# Patient Record
Sex: MALE | Race: BLACK OR AFRICAN AMERICAN
[De-identification: names, ages, dates, MRNs, and addresses within clinical notes are randomized per-mention and may not be internally consistent; named-entity substitution may affect disease eponyms.]

---

## 2019-11-30 ENCOUNTER — HOSPITAL ENCOUNTER (EMERGENCY)
Dept: HOSPITAL 56 - MW.ED | Age: 39
Discharge: HOME | End: 2019-11-30
Payer: COMMERCIAL

## 2019-11-30 DIAGNOSIS — J32.9: ICD-10-CM

## 2019-11-30 DIAGNOSIS — I10: ICD-10-CM

## 2019-11-30 DIAGNOSIS — J40: Primary | ICD-10-CM

## 2019-11-30 DIAGNOSIS — Z79.899: ICD-10-CM

## 2020-01-08 ENCOUNTER — HOSPITAL ENCOUNTER (OUTPATIENT)
Dept: HOSPITAL 41 - JD.SDS | Age: 40
Discharge: HOME | End: 2020-01-08
Attending: ORTHOPAEDIC SURGERY
Payer: COMMERCIAL

## 2020-01-08 DIAGNOSIS — S83.241A: ICD-10-CM

## 2020-01-08 DIAGNOSIS — E66.3: ICD-10-CM

## 2020-01-08 DIAGNOSIS — Z79.899: ICD-10-CM

## 2020-01-08 DIAGNOSIS — M17.11: Primary | ICD-10-CM

## 2020-01-08 DIAGNOSIS — I10: ICD-10-CM

## 2020-01-08 DIAGNOSIS — M22.41: ICD-10-CM

## 2020-01-08 DIAGNOSIS — Z79.82: ICD-10-CM

## 2020-01-08 PROCEDURE — 29881 ARTHRS KNE SRG MNISECTMY M/L: CPT

## 2020-01-08 PROCEDURE — 90471 IMMUNIZATION ADMIN: CPT

## 2020-01-08 PROCEDURE — G0008 ADMIN INFLUENZA VIRUS VAC: HCPCS

## 2020-01-08 PROCEDURE — 90686 IIV4 VACC NO PRSV 0.5 ML IM: CPT

## 2020-01-08 PROCEDURE — 87641 MR-STAPH DNA AMP PROBE: CPT

## 2020-01-08 NOTE — [id number]
Post Anesthesia Note





- EVALUATION WITHIN 48HRS OF ANESTHETIC


Vital Signs in Normal Range: Yes


Patient Participated in Evaluation: Yes


Respiratory Function Stable: Yes


Airway Patent: Yes


Cardiovascular Function Stable: Yes


Hydration Status Stable: Yes


Pain Control Satisfactory: Yes


Nausea and Vomiting Control Satisfactory: Yes


Mental Status Recovered: Yes (no complaints)


Vital Signs: 


 Last Vital Signs











Temp  97.9 F   01/08/20 09:30


 


Pulse  78   01/08/20 09:30


 


Resp  100 H  01/08/20 09:30


 


BP  141/90 H  01/08/20 09:30


 


Pulse Ox  18 L  01/08/20 09:30

## 2020-01-08 NOTE — PCM.POSTAN
POST ANESTHESIA ASSESSMENT





- MENTAL STATUS


Mental Status: Somnolent





- VITAL SIGNS


Vital Signs: 


 Last Vital Signs











Temp  97.9 F   01/08/20 06:35


 


Pulse  76   01/08/20 06:35


 


Resp  16   01/08/20 06:35


 


BP  155/96 H  01/08/20 06:35


 


Pulse Ox  99   01/08/20 06:35








69


13


97.9


134/81


99%





- RESPIRATORY


Respiratory Status: Respiratory Rate WNL, Airway Patent, O2 Saturation Stable, 

Supplemental Oxygen





- CARDIOVASCULAR


CV Status: Pulse Rate WNL, Blood Pressure Stable





- GASTROINTESTINAL


GI Status: No Symptoms





- PAIN


Pain Score: 0





- POST OP HYDRATION


Hydration Status: Adequate & Stable

## 2020-01-10 NOTE — OR
DATE OF OPERATION:  01/08/2020

 

SURGEON:  Yovani Mathews MD

 

OPERATION PERFORMED:  Right knee video arthroscopy with chondroplasty and

resection of osteophytes with partial medial meniscectomy.

 

PREOPERATIVE DIAGNOSIS:

Right knee osteoarthrosis with medial meniscus tear.

 

POSTOPERATIVE DIAGNOSIS:

Right knee osteoarthrosis with medial meniscus tear.

 

ANESTHESIA:

General LMA with local.

 

ANESTHESIA PROVIDER:

Jennifer Nye CRNA.

 

ASSISTANT:

Lisseth Burgos PA-C.

 

ESTIMATED BLOOD LOSS:

Less than 5 mL.

 

COMPLICATIONS:

None.

 

CONDITION:

Stable.

 

DESCRIPTION OF PROCEDURE:

The patient was identified in the preop holding area.  Proper site was marked

and identified and a time-out was performed.  The patient was taken back to the

operating theater, where after adequate anesthesia, the patient's left lower

extremity was placed in a Well-Leg ramon.  Right lower extremity had a

nonsterile tourniquet applied and then was placed in a C-clamp ramon.  Foot of

the bed was then lowered.  Right lower extremity was then sterilely prepped and

draped in the usual sterile fashion.  OR time-out was performed.  The patient

received 2 g of IV Ancef.  Right lower extremity was then exsanguinated.

Tourniquet was insufflated to 250 mmHg.  A standard anterolateral portal

incision was made.  Scope trocar was introduced to the knee joint.  The patient

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

patella.  He was noted to have a large osteophyte on the medial femoral condyle

as well as the lateral femoral condyle as well as a smaller one on the lateral

femoral condyle.  At this time, the patient was noted have medial meniscus tear

and partial medial meniscectomy was performed back to a stable rim after an

anteromedial portal was created with the use of a spinal needle.  At this time,

using a 5-0 full-radius carson, I did resect most of the medial osteophyte off the

femur.  The ACL was found to be deficient in the notch.  The lateral condyle did

show grade 2/3 chondromalacia as well as the medial femoral condyle, and I did

perform a chondroplasty of any loose pieces, and then, the carson was then again

used for the small osteophyte off the lateral femoral condyle.  The patient at

this time had excess saline drained from the knee. 3-0 nylon suture was used for

closure of the skin.  The patient tolerated the procedure well, sent to PACU in

stable condition.

 

DD:  01/10/2020 09:50:47

DT:  01/10/2020 10:16:02  TYRELL

Job #:  [id number]

## 2020-01-10 NOTE — PCM.OPNOTE
- General Post-Op/Procedure Note


Date of Surgery/Procedure: 01/08/20


Operative Procedure(s): right knee video arthroscopy with chondroplasty and 

resection of osteophytes with partial medial meiscecotmy


Pre Op Diagnosis: right knee osteoarthrosis with medial meniscus tear


Post-Op Diagnosis: Same


Anesthesia Technique: General LMA, Local


Primary Surgeon: Yovani Mathews


Anesthesia Provider: Jennifer Nye


Assistant: Lisseth Burgos in mLs: 5


Complications: None


Condition: Good

## 2020-01-13 ENCOUNTER — HOSPITAL ENCOUNTER (INPATIENT)
Dept: HOSPITAL 56 - MW.ED | Age: 40
LOS: 3 days | Discharge: HOME | DRG: 206 | End: 2020-01-16
Attending: INTERNAL MEDICINE | Admitting: INTERNAL MEDICINE
Payer: COMMERCIAL

## 2020-01-13 DIAGNOSIS — I26.99: ICD-10-CM

## 2020-01-13 DIAGNOSIS — I10: ICD-10-CM

## 2020-01-13 DIAGNOSIS — Z79.82: ICD-10-CM

## 2020-01-13 DIAGNOSIS — I82.451: ICD-10-CM

## 2020-01-13 DIAGNOSIS — F41.9: ICD-10-CM

## 2020-01-13 DIAGNOSIS — I97.89: Primary | ICD-10-CM

## 2020-01-13 DIAGNOSIS — I82.441: ICD-10-CM
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
## 2020-01-13 DIAGNOSIS — F32.9: ICD-10-CM

## 2020-01-13 LAB
BUN SERPL-MCNC: 16 MG/DL (ref 7–18)
CHLORIDE SERPL-SCNC: 104 MMOL/L (ref 98–107)
CO2 SERPL-SCNC: 26.5 MMOL/L (ref 21–32)
GLUCOSE SERPL-MCNC: 82 MG/DL (ref 74–106)
POTASSIUM SERPL-SCNC: 4.2 MMOL/L (ref 3.5–5.1)
SODIUM SERPL-SCNC: 140 MMOL/L (ref 136–148)

## 2020-01-13 PROCEDURE — G0378 HOSPITAL OBSERVATION PER HR: HCPCS

## 2020-01-13 RX ADMIN — HYDROCODONE BITARTRATE AND ACETAMINOPHEN PRN TAB: 5; 325 TABLET ORAL at 16:24

## 2020-01-14 LAB
BUN SERPL-MCNC: 16 MG/DL (ref 7–18)
CHLORIDE SERPL-SCNC: 103 MMOL/L (ref 98–107)
CO2 SERPL-SCNC: 27.8 MMOL/L (ref 21–32)
GLUCOSE SERPL-MCNC: 89 MG/DL (ref 74–106)
POTASSIUM SERPL-SCNC: 4.1 MMOL/L (ref 3.5–5.1)
SODIUM SERPL-SCNC: 139 MMOL/L (ref 136–148)

## 2020-01-14 RX ADMIN — HYDROCODONE BITARTRATE AND ACETAMINOPHEN PRN TAB: 5; 325 TABLET ORAL at 00:10

## 2020-01-14 RX ADMIN — HYDROCODONE BITARTRATE AND ACETAMINOPHEN PRN TAB: 5; 325 TABLET ORAL at 04:18

## 2020-01-14 RX ADMIN — HEPARIN SODIUM SCH MLS/HR: 5000 INJECTION, SOLUTION INTRAVENOUS at 20:13

## 2020-01-15 LAB
BUN SERPL-MCNC: 11 MG/DL (ref 7–18)
CHLORIDE SERPL-SCNC: 100 MMOL/L (ref 98–107)
CO2 SERPL-SCNC: 28 MMOL/L (ref 21–32)
GLUCOSE SERPL-MCNC: 108 MG/DL (ref 74–106)
POTASSIUM SERPL-SCNC: 3.8 MMOL/L (ref 3.5–5.1)
SODIUM SERPL-SCNC: 138 MMOL/L (ref 136–148)

## 2020-01-15 RX ADMIN — HEPARIN SODIUM SCH MLS/HR: 5000 INJECTION, SOLUTION INTRAVENOUS at 10:15

## 2020-01-16 LAB
BUN SERPL-MCNC: 12 MG/DL (ref 7–18)
CHLORIDE SERPL-SCNC: 96 MMOL/L (ref 98–107)
CO2 SERPL-SCNC: 29.2 MMOL/L (ref 21–32)
GLUCOSE SERPL-MCNC: 93 MG/DL (ref 74–106)
POTASSIUM SERPL-SCNC: 3.6 MMOL/L (ref 3.5–5.1)
SODIUM SERPL-SCNC: 135 MMOL/L (ref 136–148)

## 2020-01-16 RX ADMIN — HEPARIN SODIUM SCH MLS/HR: 5000 INJECTION, SOLUTION INTRAVENOUS at 01:16

## 2020-01-20 ENCOUNTER — HOSPITAL ENCOUNTER (EMERGENCY)
Dept: HOSPITAL 56 - MW.ED | Age: 40
Discharge: HOME | End: 2020-01-20
Payer: COMMERCIAL

## 2020-01-20 DIAGNOSIS — Z86.711: ICD-10-CM

## 2020-01-20 DIAGNOSIS — F32.9: ICD-10-CM

## 2020-01-20 DIAGNOSIS — I10: ICD-10-CM

## 2020-01-20 DIAGNOSIS — Z79.899: ICD-10-CM

## 2020-01-20 DIAGNOSIS — M79.661: Primary | ICD-10-CM

## 2020-01-20 DIAGNOSIS — F41.9: ICD-10-CM

## 2020-01-30 ENCOUNTER — HOSPITAL ENCOUNTER (OUTPATIENT)
Dept: HOSPITAL 41 - JD.SDS | Age: 40
Setting detail: OBSERVATION
LOS: 1 days | Discharge: HOME | End: 2020-01-31
Attending: ORTHOPAEDIC SURGERY | Admitting: ORTHOPAEDIC SURGERY
Payer: COMMERCIAL

## 2020-01-30 DIAGNOSIS — F32.9: ICD-10-CM

## 2020-01-30 DIAGNOSIS — M25.061: ICD-10-CM

## 2020-01-30 DIAGNOSIS — I10: ICD-10-CM

## 2020-01-30 DIAGNOSIS — M25.461: Primary | ICD-10-CM

## 2020-01-30 DIAGNOSIS — M17.11: ICD-10-CM

## 2020-01-30 DIAGNOSIS — F41.9: ICD-10-CM

## 2020-01-30 DIAGNOSIS — Z79.899: ICD-10-CM

## 2020-01-30 PROCEDURE — 36415 COLL VENOUS BLD VENIPUNCTURE: CPT

## 2020-01-30 PROCEDURE — G0378 HOSPITAL OBSERVATION PER HR: HCPCS

## 2020-01-30 PROCEDURE — 87641 MR-STAPH DNA AMP PROBE: CPT

## 2020-01-30 PROCEDURE — 87075 CULTR BACTERIA EXCEPT BLOOD: CPT

## 2020-01-30 PROCEDURE — 87205 SMEAR GRAM STAIN: CPT

## 2020-01-30 PROCEDURE — 29875 ARTHRS KNEE SURG SYNVCT LMTD: CPT

## 2020-01-30 PROCEDURE — 94760 N-INVAS EAR/PLS OXIMETRY 1: CPT

## 2020-01-30 PROCEDURE — 85027 COMPLETE CBC AUTOMATED: CPT

## 2020-01-30 PROCEDURE — 80053 COMPREHEN METABOLIC PANEL: CPT

## 2020-01-30 RX ADMIN — EPINEPHRINE ONE MG: 1 INJECTION PARENTERAL at 15:30

## 2020-01-30 RX ADMIN — OXYCODONE HYDROCHLORIDE AND ACETAMINOPHEN PRN TAB: 5; 325 TABLET ORAL at 16:47

## 2020-01-30 RX ADMIN — FENTANYL CITRATE PRN MCG: 50 INJECTION, SOLUTION INTRAMUSCULAR; INTRAVENOUS at 17:08

## 2020-01-30 RX ADMIN — OXYCODONE HYDROCHLORIDE AND ACETAMINOPHEN PRN TAB: 5; 325 TABLET ORAL at 21:34

## 2020-01-30 RX ADMIN — FENTANYL CITRATE PRN MCG: 50 INJECTION, SOLUTION INTRAMUSCULAR; INTRAVENOUS at 16:16

## 2020-01-30 RX ADMIN — FENTANYL CITRATE PRN MCG: 50 INJECTION, SOLUTION INTRAMUSCULAR; INTRAVENOUS at 16:28

## 2020-01-30 RX ADMIN — FENTANYL CITRATE PRN MCG: 50 INJECTION, SOLUTION INTRAMUSCULAR; INTRAVENOUS at 16:44

## 2020-01-30 RX ADMIN — EPINEPHRINE ONE: 1 INJECTION PARENTERAL at 20:05

## 2020-01-30 NOTE — PCM.POSTAN
POST ANESTHESIA ASSESSMENT





- MENTAL STATUS


Mental Status: Alert, Oriented





- VITAL SIGNS


Vital Signs: 


 Last Vital Signs











Temp  36.6 C   01/30/20 16:12


 


Pulse  102 H  01/30/20 16:12


 


Resp  22 H  01/30/20 16:12


 


BP  180/110  01/30/20 16:12


 


Pulse Ox  99   01/30/20 16:12














- RESPIRATORY


Respiratory Status: Respiratory Rate WNL, Airway Patent, O2 Saturation Stable, 

Supplemental Oxygen





- CARDIOVASCULAR


CV Status: Pulse Rate WNL, Elevated Blood Pressure





- GASTROINTESTINAL


GI Status: No Symptoms





- PAIN


Pain Score: 10





- POST OP HYDRATION


Hydration Status: Adequate & Stable

## 2020-01-30 NOTE — PCM.SN
- Free Text/Narrative


Note: 





Pain Management: 





Ramos continues to have intense pain in PACU. Loretta Montanez notified of 

continued pain level of 10/10. Requested 15 mg IV ketorolac one dose. Last 

Xarelto dose was 1/29/2020 at 0800. Will continue to monitor in PACU.

## 2020-01-30 NOTE — PCM.PREANE
Preanesthetic Assessment





- Procedure


Proposed Procedure: 





right knee arthroscopic i and d





- Anesthesia/Transfusion/Family Hx


Anesthesia History: Prior Anesthesia Without Reaction


Family History of Anesthesia Reaction: No


Transfusion History: No Prior Transfusion(s)





- Review of Systems


General: No Symptoms


Pulmonary: Shortness of Breath (blood clot right calf and litle ones in the 

lungs- blood clots)


Cardiovascular: No Symptoms


Gastrointestinal: No Symptoms


Neurological: No Symptoms


Other: Reports: None





- Physical Assessment


NPO Status Date: 01/29/20


NPO Status Time: 23:00


Vital Signs: 





 Last Vital Signs











Temp  99.0 F   01/30/20 11:03


 


Pulse  86   01/30/20 11:03


 


Resp  18   01/30/20 11:03


 


BP      


 


Pulse Ox  97   01/30/20 11:03











Height: 5 ft 11 in


Weight: 99.79 kg


ASA Class: 2


Mental Status: Alert & Oriented x3


Airway Class: Mallampati = 1


Dentition: Reports: Normal Dentition


Thyro-Mental Finger Breadths: 3


Mouth Opening Finger Breadths: 3


ROM/Head Extension: Full


Lungs: Clear to Auscultation, Normal Respiratory Effort


Cardiovascular: Regular Rate, Regular Rhythm





- Lab


Values: 





 Laboratory Last Values











MRSA (PCR)  Negative   01/29/20  17:32    














- Allergies


Allergies/Adverse Reactions: 


 Allergies











Allergy/AdvReac Type Severity Reaction Status Date / Time


 


No Known Allergies Allergy   Verified 01/20/20 10:19 CST














- Blood


Blood Available: No





- Acknowledgements


Anesthesia Type Planned: General Anesthesia


Pt an Appropriate Candidate for the Planned Anesthesia: Yes


Alternatives and Risks of Anesthesia Discussed w Pt/Guardian: Yes


Pt/Guardian Understands and Agrees with Anesthesia Plan: Yes





PreAnesthesia Questionnaire


HEENT History: Reports: None


Other HEENT History: hx of sinus infection


Cardiovascular History: Reports: Hypertension


Respiratory History: Reports: None, Other (See Below) (has blood clots in the 

lung- started xarelto 1/16- has blood clot right calf)


Other Respiratory History: tracheal bronchitis within the last 6 months


Gastrointestinal History: Reports: Other (See Below)


Genitourinary History: Reports: None


OB/GYN History: Reports: None


Musculoskeletal History: Reports: None


Neurological History: Reports: None


Psychiatric History: Reports: Anxiety, Depression


Endocrine/Metabolic History: Reports: None


Hematologic History: Reports: None


Immunologic History: Reports: None


Oncologic (Cancer) History: Reports: None


Dermatologic History: Reports: None





- Infectious Disease History


Infectious Disease History: Reports: Chicken Pox





- Past Surgical History


Head Surgeries/Procedures: Reports: None


HEENT Surgical History: Reports: None


Cardiovascular Surgical History: Reports: None


Respiratory Surgical History: Reports: None


GI Surgical History: Reports: Hernia, Inguinal


Male  Surgical History: Reports: None


Endocrine Surgical History: Reports: None


Neurological Surgical History: Reports: None


Musculoskeletal Surgical History: Reports: Arthroscopic Knee


Oncologic Surgical History: Reports: None





- SUBSTANCE USE


Smoking Status *Q: Never Smoker


Tobacco Use Within Last Twelve Months: No


Second Hand Smoke Exposure: No


Days Per Week of Alcohol Use: 0


Recreational Drug Use History: No





- HOME MEDS


Home Medications: 


 Home Meds





Albuterol [Ventolin HFA] 1 - 2 puff INH Q4H PRN 01/07/20 [History]


Lisinopril/Hydrochlorothiazide [Lisinopril-Hctz 20-12.5 mg Tab] 1 tab PO DAILY 

01/07/20 [History]


Rivaroxaban [Xarelto] 15 mg PO Q12H 21 Days #42 tablet 01/16/20 [Rx]


amLODIPine [Norvasc] 10 mg PO DAILY 01/16/20 [History]


oxyCODONE 5 - 10 mg PO Q4H PRN  tablet 01/16/20 [Rx]


Acetaminophen [Tylenol Extra Strength] 1,000 mg PO Q6HR PRN 01/20/20 [History]











- CURRENT (IN HOUSE) MEDS


Current Meds: 





 Current Medications





Lactated Ringer's (Ringers, Lactated)  1,000 mls @ 125 mls/hr IV ASDIRECTED JOLLY


Lidocaine/Sodium Bicarbonate (Buffered Lidocaine 1% In Ns 8.4%)  0.25 ml IDERM 

ONETIME PRN


   PRN Reason: Prior to IV Start


Sodium Chloride (Saline Flush)  10 ml FLUSH ASDIRECTED PRN


   PRN Reason: Keep Vein Open

## 2020-01-31 RX ADMIN — OXYCODONE HYDROCHLORIDE AND ACETAMINOPHEN PRN TAB: 5; 325 TABLET ORAL at 10:36

## 2020-01-31 RX ADMIN — OXYCODONE HYDROCHLORIDE AND ACETAMINOPHEN PRN TAB: 5; 325 TABLET ORAL at 05:24

## 2020-01-31 RX ADMIN — OXYCODONE HYDROCHLORIDE AND ACETAMINOPHEN PRN TAB: 5; 325 TABLET ORAL at 01:21

## 2020-01-31 NOTE — PCM48HPAN
Post Anesthesia Note





- EVALUATION WITHIN 48HRS OF ANESTHETIC


Vital Signs in Normal Range: Yes


Patient Participated in Evaluation: Yes


Respiratory Function Stable: Yes


Airway Patent: Yes


Cardiovascular Function Stable: Yes


Hydration Status Stable: Yes


Pain Control Satisfactory: Yes


Nausea and Vomiting Control Satisfactory: Yes


Mental Status Recovered: Yes


Vital Signs: 


 Last Vital Signs











Temp  36.7 C   01/31/20 07:28


 


Pulse  73   01/31/20 07:28


 


Resp  12   01/31/20 07:28


 


BP  128/83   01/31/20 07:28


 


Pulse Ox  98   01/31/20 07:28














- COMMENTS/OBSERVATIONS


Free Text/Narrative:: 





no anesthesia complications noted

## 2020-02-03 NOTE — OR
DATE OF OPERATION:  01/30/2020

 

SURGEON:  Yovani Mathews MD

 

OPERATION PERFORMED:  Right knee video arthroscopy with irrigation and partial

synovectomy.

 

PREOPERATIVE DIAGNOSIS:

Right knee pain and effusion.

 

POSTOPERATIVE DIAGNOSIS:

Right knee pain and effusion.

 

ANESTHESIA:

General LMA with local.

 

ANESTHESIA PROVIDER:

Lennie Beltran CRNA.

 

ASSISTANT:

Lisseth Burgos PA-C.

 

ESTIMATED BLOOD LOSS:

Less than 5 mL.

 

COMPLICATIONS:

None.

 

CONDITION:

Stable.

 

DESCRIPTION OF PROCEDURE:

The patient was identified in the preoperative holding area.  Proper site was

marked and identified by the surgeon.  The patient was taken back to the

operative theater, where after adequate anesthesia, the patient's left lower

extremity was placed in a well leg ramon.  Right lower extremity had a

nonsterile tourniquet applied and it was placed in a C-clamp ramon.  The foot

of the bed was then lowered, and the patient's right lower extremity was

sterilely prepped and draped in the usual sterile fashion.  OR time-out was

performed.  The patient did not receive antibiotics until after cultures were

taken.  At this time, the tourniquet was insufflated to 250 mmHg just with

elevation as the patient had a large effusion.  Stab incision was made

laterally.  I did place the trocar into the knee and removed the stylet out of

it, took all fluid out, and I did 3 sets of cultures from inside the knee, both

aerobic and anaerobic and Gram stain.  These were then sent.  The patient then

received IV antibiotics of 2 g Ancef.  I made a medial incision as well.  At

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

gross purulence noted.  At this time, he did have significant synovial

overgrowth inside the knee, and I did a partial synovectomy of all portions of

synovium that could be seen other than the posterior portion, so I did the

medial and lateral gutters and into the suprapatellar pouch region doing a

complete synovectomy on those regions.  The patient at this time, otherwise had

the previous osteoarthritis that was noted, but no other interval changes.

Excess saline was drained from the knee.  3-0 nylon suture was used for closure

of the skin.  The patient tolerated the procedure well and was sent to PACU in

stable condition.

 

DD:  02/03/2020 17:27:14

DT:  02/03/2020 17:49:24  MMODAL

Job #:  [id number]

## 2020-02-03 NOTE — PCM.DCSUM1
**Discharge Summary





- Hospital Course


Brief History: Ramos is a 38 yo male who underwent right KVA with I&D for 

suspected knee infection with Dr. Mathews on 1-.  The procedure was 

completed under general anesthesia.  The pt tolerated the procedure well, 

however, was admitted to the Medical-Surgical Unit under observation status due 

to difficulty obtaining adequate pain control in the post-operative period.  

The pt's Hospital course was uneventful.  The pt received 3 doses of Ancef in 

the post-operative period.  The pt's Hgb on POD#1 was 10.0 and WBC 5.93.  The 

pt resumed use of Xarelto in the evening of POD#0.  SCDs and TEDs were also 

ordered.  The pt was allowed to WBAT.  On POD#1, the pt was deemed appropriate 

to discharge to home with his wife.  He was scheduled for outpatient IV 

antibiotic therapy in [place] and will receive Rocephin daily and PO Bactrim 

DS.





- Discharge Data


Discharge Date: 01/31/20


Discharge Disposition: Home, Self-Care 01


Condition: Good





- Referral to Home Health


Primary Care Physician: 


Anirudh Walters MD








- Patient Instructions


Diet: Usual Diet as Tolerated


Activity: Apply Ice, As Tolerated, Elevate Extremity


Driving: Do Not Drive


Showering/Bathing: May Shower


Wound/Incision Care: Keep Operative Site/Wound Site Clean and Dry


Notify Provider of: Fever, Increased Pain, Swelling and Redness, Drainage, 

Nausea and/or Vomiting


Other/Special Instructions: Please keep the dressing in place for 5 days.  

After that time, you may remove the dressing.  Please keep the incisions clean 

and dry.  Do not get the incisions wet at this time.  Please take the oral 

antibiotic twice daily as directed and please have IV antibiotic once daily in 

[place].  Please get up and moving around every hour while awake.  Use 

crutches as needed and have help with mobility as needed.  Please take a short 

walk in your home every hour while awake.  Please resume use of Xarelto.  

Please follow-up with your primary care provider.  Please elevate the limb and 

place ice to the knee often.  You may use the pain medication as needed.  

Discontinue use of pain medication as soon as able.  A prescription for 

PERCOCET was sent to [facility].  This narcotic pain medication will replace 

all other pain medication you have used recently.  Do not take different pain 

medications at the same time.  Use ONLY ONE pain medication as directed.  A 

prescription for a muscle relaxant and the oral antibiotic were also sent to 

[facility].  PLEASE schedule a follow-up appointment with your primary care 

provider also.  Use the Xarelto twice daily as directed.  Do not miss a dose of 

the medication.  Please call the Clinic with questions or concerns - [phone] - 

option 6.





- Discharge Plan


*PRESCRIPTION DRUG MONITORING PROGRAM REVIEWED*: No


*COPY OF PRESCRIPTION DRUG MONITORING REPORT IN PATIENT ELAINE: No


Prescriptions/Med Rec: 


Acetaminophen/oxyCODONE [Percocet 325-5 MG] 1 - 2 tab PO Q4H PRN #20 tablet


 PRN Reason: Pain


Cyclobenzaprine [Flexeril] 10 mg PO BID PRN #20 tablet


 PRN Reason: Spasms


Sulfamethoxazole/Trimethoprim [Septra DS] 1 tab PO BID #20 tablet


Home Medications: 


 Home Meds





Albuterol [Ventolin HFA] 1 - 2 puff INH Q4H PRN 01/07/20 [History]


Lisinopril/Hydrochlorothiazide [Lisinopril-Hctz 20-12.5 mg Tab] 1 tab PO DAILY 

01/07/20 [History]


Rivaroxaban [Xarelto] 15 mg PO Q12H 21 Days #42 tablet 01/16/20 [Rx]


amLODIPine [Norvasc] 10 mg PO DAILY 01/16/20 [History]


Acetaminophen/oxyCODONE [Percocet 325-5 MG] 1 - 2 tab PO Q4H PRN #20 tablet 01/ 31/20 [Rx]


Cyclobenzaprine [Flexeril] 10 mg PO BID PRN #20 tablet 01/31/20 [Rx]


Docusate Sodium [Colace] 100 mg PO BID  cap 01/31/20 [Rx]


Famotidine [Pepcid] 20 mg PO Q12H  tablet 01/31/20 [Rx]


Magnesium Hydroxide [Milk of Magnesia] 30 ml PO BID PRN  cup 01/31/20 [Rx]


Sennosides [Senna] 8.6 mg PO BID PRN  tablet 01/31/20 [Rx]


Sulfamethoxazole/Trimethoprim [Septra DS] 1 tab PO BID #20 tablet 01/31/20 [Rx]


bisacodyL [Dulcolax] 5 mg PO DAILY PRN  tablet 01/31/20 [Rx]


traMADol HCl [Tramadol HCl] 50 - 100 mg PO Q12HR PRN 01/31/20 [History]








Patient Handouts:  Rivaroxaban oral tablets, Surgical Wound Debridement


Referrals: 


Yovani Mathews MD [Physician] - 02/12/20 2:00 pm (Please see Dr. Mathews on 

Wednesday, Feb 12th @ 2:00 pm for follow-up appointment. )





- Discharge Summary/Plan Comment


DC Time >30 min.: No





- Patient Data


Vitals - Most Recent: 


 Last Vital Signs











Temp  98.1 F   01/31/20 11:10


 


Pulse  82   01/31/20 11:10


 


Resp  16   01/31/20 11:10


 


BP  150/73 H  01/31/20 11:10


 


Pulse Ox  100   01/31/20 11:10











Weight - Most Recent: 218 lb


DONNA Results - Last 24 hrs: 


 Microbiology











 01/30/20 15:26 Gram Stain - Final





 Knee, Right Anaerobic Culture - Preliminary





    NO GROWTH AFTER 4 DAYS


 


 01/30/20 15:24 Gram Stain - Final





 Knee, Right Anaerobic Culture - Preliminary





    NO GROWTH AFTER 4 DAYS


 


 01/30/20 15:25 Gram Stain - Final





 Knee, Right Anaerobic Culture - Preliminary





    NO GROWTH AFTER 3 DAYS











Med Orders - Current: 


 Current Medications








Discontinued Medications





Hydrocodone Bitart/Acetaminophen (Norco 325-5 Mg)  1 - 2 tab PO Q4H JOLLY


   Last Admin: 01/31/20 10:37 Dose:  Not Given


Albuterol (Proventil Hfa)  1 - 2 gm INH Q4H PRN


   PRN Reason: Shortness of Breath


Albuterol (Proventil Hfa)  1 - 2 gm INH Q4H PRN


   PRN Reason: Shortness of Breath


Amlodipine Besylate (Norvasc)  10 mg PO DAILY JOLLY


Bisacodyl (Dulcolax)  5 mg PO DAILY PRN


   PRN Reason: Constipation


Bupivacaine HCl (Sensorcaine-Mpf 0.25%) Confirm Administered Dose 30 ml .ROUTE 

.STK-MED ONE


   Stop: 01/30/20 14:36


Cefazolin Sodium (Ancef) Confirm Administered Dose 2 gm .ROUTE .STK-MED ONE


   Stop: 01/30/20 14:14


Cyclobenzaprine HCl (Flexeril)  10 mg PO TID PRN


   PRN Reason: Spasms


   Last Admin: 01/31/20 01:21 Dose:  10 mg


Dexamethasone (Dexamethasone) Confirm Administered Dose 20 mg .ROUTE .STK-MED 

ONE


   Stop: 01/30/20 14:17


Docusate Sodium (Colace)  100 mg PO BID [facility]


   Last Admin: 01/31/20 09:07 Dose:  100 mg


Epinephrine HCl (Adrenalin)  3 mg .XX ONETIME ONE


   Stop: 01/30/20 14:30


   Last Admin: 01/30/20 20:05 Dose:  Not Given


Famotidine (Pepcid)  20 mg PO Q12H [facility]


   Last Admin: 01/31/20 05:23 Dose:  20 mg


Fentanyl (Sublimaze) Confirm Administered Dose 100 mcg .ROUTE .STK-MED ONE


   Stop: 01/30/20 13:57


Fentanyl (Sublimaze) Confirm Administered Dose 100 mcg .ROUTE .STK-MED ONE


   Stop: 01/30/20 14:14


Fentanyl (Sublimaze) Confirm Administered Dose 100 mcg .ROUTE .STK-MED ONE


   Stop: 01/30/20 15:22


Fentanyl (Sublimaze)  50 mcg IVPUSH Q5M PRN


   PRN Reason: Pain


   Last Admin: 01/30/20 17:08 Dose:  50 mcg


Hydrochlorothiazide (Hydrochlorothiazide)  12.5 mg PO DAILY [facility]


Hydromorphone HCl (Dilaudid)  0.5 mg IVPUSH Q10M PRN


   PRN Reason: Pain (severe 7-10)


   Last Admin: 01/30/20 16:23 Dose:  0.5 mg


Hydromorphone HCl (Dilaudid) Confirm Administered Dose 0.5 mg .ROUTE .STK-MED 

ONE


   Stop: 01/30/20 15:57


Hydromorphone HCl (Dilaudid) Confirm Administered Dose 0.5 mg .ROUTE .STK-MED 

ONE


   Stop: 01/30/20 16:01


Hydromorphone HCl (Dilaudid)  0.5 mg IVPUSH Q15M PRN


   PRN Reason: severe pain


   Last Admin: 01/30/20 17:42 Dose:  0.5 mg


Lactated Ringer's (Ringers, Lactated)  1,000 mls @ 125 mls/hr IV ASDIRECTED [facility]


   Last Admin: 01/30/20 12:40 Dose:  125 mls/hr


Lactated Ringer's (Ringers, Lactated) Confirm Administered Dose 1,000 mls @ as 

directed .ROUTE .[facility]-MED ONE


   Stop: 01/30/20 14:14


Lidocaine HCl (Xylocaine-Mpf 1%) Confirm Administered Dose 4 mls @ as directed 

.ROUTE .[facility] ONE


   Stop: 01/30/20 14:15


Lactated Ringer's (Ringers, Lactated) Confirm Administered Dose 1,000 mls @ as 

directed .ROUTE .[facility] ONE


   Stop: 01/30/20 15:46


Cefazolin Sodium/Dextrose 2 gm (/ Premix)  50 mls @ 100 mls/hr IV Q8H [facility]


   Stop: 01/31/20 14:29


   Last Admin: 01/31/20 13:20 Dose:  100 mls/hr


Ketamine HCl (Ketalar) Confirm Administered Dose 500 mg .ROUTE .STK-MED ONE


   Stop: 01/30/20 15:17


Ketorolac Tromethamine (Toradol)  15 mg IVPUSH ONETIME ONE


   Stop: 01/30/20 16:56


   Last Admin: 01/30/20 17:41 Dose:  15 mg


Lidocaine/Sodium Bicarbonate (Buffered Lidocaine 1% In Ns 8.4%)  0.25 ml IDERM 

ONETIME PRN


   PRN Reason: Prior to IV Start


   Last Admin: 01/30/20 12:40 Dose:  0.25 ml


Lisinopril (Prinivil)  20 mg PO DAILY [facility]


Magnesium Hydroxide (Milk Of Magnesia)  30 ml PO BID PRN


   PRN Reason: Constipation


Midazolam HCl (Versed 1 Mg/Ml) Confirm Administered Dose 2 mg .ROUTE .[facility] 

ONE


   Stop: 01/30/20 14:14


Midazolam HCl (Versed 1 Mg/Ml)  2 mg IVPUSH ONETIME PRN


   PRN Reason: Sedation


   Last Admin: 01/30/20 17:09 Dose:  2 mg


Morphine Sulfate (Morphine)  2 mg IVPUSH Q2H PRN


   PRN Reason: Breakthrough Pain


Naloxone HCl (Narcan)  0.1 mg IVPUSH Q5M PRN


   PRN Reason: Oversedation


Ondansetron HCl (Zofran) Confirm Administered Dose 4 mg .ROUTE .[facility]-MED ONE


   Stop: 01/30/20 14:14


Ondansetron HCl (Zofran)  4 mg IVPUSH ONETIME PRN


   PRN Reason: Nausea/Vomiting


Ondansetron HCl (Zofran)  4 mg IVPUSH Q6H PRN


   PRN Reason: Nausea/Vomiting


Oxycodone HCl (Oxycodone)  5 mg PO Q6H PRN


   PRN Reason: Pain


   Last Admin: 01/31/20 05:23 Dose:  5 mg


Oxycodone/Acetaminophen (Percocet 325-5 Mg)  1 - 2 tab PO Q4H PRN


   PRN Reason: Pain


   Last Admin: 01/31/20 10:36 Dose:  2 tab


Propofol (Diprivan  20 Ml) Confirm Administered Dose 400 mg .ROUTE .STK-MED ONE


   Stop: 01/30/20 14:14


Propofol (Diprivan  20 Ml) Confirm Administered Dose 200 mg .ROUTE .STK-MED ONE


   Stop: 01/30/20 15:21


Rivaroxaban (Xarelto)  15 mg PO BID [facility]


   Last Admin: 01/31/20 09:07 Dose:  15 mg


Rivaroxaban (Xarelto)  15 mg PO Q12H [facility]


Senna (Senna)  8.6 mg PO BID PRN


   PRN Reason: Constipation


Sodium Chloride (Saline Flush)  10 ml FLUSH ASDIRECTED PRN


   PRN Reason: Keep Vein Open


Tramadol HCl (Ultram)  50 - 100 mg PO Q12HR PRN


   PRN Reason: Pain


Trimethoprim/Sulfamethoxazole (Septra Ds)  1 tab PO BID [facility]


   Last Admin: 01/31/20 10:37 Dose:  1 tab

## 2020-02-03 NOTE — PCM.OPNOTE
- General Post-Op/Procedure Note


Date of Surgery/Procedure: 01/30/20


Operative Procedure(s): right knee video arthroscopy with irrigation and 

partial synovectomy


Pre Op Diagnosis: right knee pain and effusion


Post-Op Diagnosis: Same


Anesthesia Technique: General LMA, Local


Primary Surgeon: Yovani Mathews


Anesthesia Provider: Omaira Walsh


Assistant: Lisseth Burgos in mLs: 5


Complications: None


Condition: Good

## 2020-09-04 ENCOUNTER — HOSPITAL ENCOUNTER (EMERGENCY)
Dept: HOSPITAL 56 - MW.ED | Age: 40
Discharge: HOME | End: 2020-09-04
Payer: COMMERCIAL

## 2020-09-04 DIAGNOSIS — K92.1: Primary | ICD-10-CM

## 2020-09-04 DIAGNOSIS — Z79.899: ICD-10-CM

## 2020-09-04 DIAGNOSIS — I10: ICD-10-CM

## 2020-09-04 LAB
BUN SERPL-MCNC: 15 MG/DL (ref 7–18)
CHLORIDE SERPL-SCNC: 105 MMOL/L (ref 98–107)
CO2 SERPL-SCNC: 26.5 MMOL/L (ref 21–32)
GLUCOSE SERPL-MCNC: 93 MG/DL (ref 74–106)
POTASSIUM SERPL-SCNC: 3.8 MMOL/L (ref 3.5–5.1)
SODIUM SERPL-SCNC: 140 MMOL/L (ref 136–148)

## 2020-09-04 PROCEDURE — 80053 COMPREHEN METABOLIC PANEL: CPT

## 2020-09-04 PROCEDURE — 99285 EMERGENCY DEPT VISIT HI MDM: CPT

## 2020-09-04 PROCEDURE — 74177 CT ABD & PELVIS W/CONTRAST: CPT

## 2020-09-04 PROCEDURE — 85025 COMPLETE CBC W/AUTO DIFF WBC: CPT

## 2020-09-04 PROCEDURE — 96360 HYDRATION IV INFUSION INIT: CPT

## 2020-09-04 PROCEDURE — 83690 ASSAY OF LIPASE: CPT

## 2020-09-04 PROCEDURE — 85610 PROTHROMBIN TIME: CPT

## 2020-09-30 ENCOUNTER — HOSPITAL ENCOUNTER (OUTPATIENT)
Dept: HOSPITAL 56 - MW.SDS | Age: 40
Discharge: HOME | End: 2020-09-30
Attending: SURGERY
Payer: COMMERCIAL

## 2020-09-30 DIAGNOSIS — Z87.891: ICD-10-CM

## 2020-09-30 DIAGNOSIS — K63.5: Primary | ICD-10-CM

## 2020-09-30 DIAGNOSIS — F32.9: ICD-10-CM

## 2020-09-30 DIAGNOSIS — F41.9: ICD-10-CM

## 2020-09-30 DIAGNOSIS — K64.8: ICD-10-CM

## 2020-09-30 DIAGNOSIS — K21.0: ICD-10-CM

## 2020-09-30 DIAGNOSIS — K29.50: ICD-10-CM

## 2020-09-30 DIAGNOSIS — K44.9: ICD-10-CM

## 2020-09-30 PROCEDURE — 43239 EGD BIOPSY SINGLE/MULTIPLE: CPT

## 2020-09-30 PROCEDURE — 45385 COLONOSCOPY W/LESION REMOVAL: CPT

## 2023-04-20 NOTE — PCM.PREANE
Preanesthetic Assessment





- Procedure


Proposed Procedure: 





right knee video arthroscoppy





- Anesthesia/Transfusion/Family Hx


Anesthesia History: Prior Anesthesia Without Reaction


Family History of Anesthesia Reaction: No


Transfusion History: No Prior Transfusion(s)





- Review of Systems


General: No Symptoms


Pulmonary: No Symptoms


Cardiovascular: No Symptoms


Gastrointestinal: No Symptoms


Neurological: No Symptoms


Other: Reports: None





- Physical Assessment


NPO Status Date: 01/07/20


NPO Status Time: 20:00


Vital Signs: 





155/96


16


99%


97.9


Height: 5 ft 11 in


Weight: 95.708 kg


ASA Class: 2


Mental Status: Alert & Oriented x3


Airway Class: Mallampati = 1


Dentition: Reports: Normal Dentition


Thyro-Mental Finger Breadths: 3


Mouth Opening Finger Breadths: 3


ROM/Head Extension: Full


Lungs: Clear to Auscultation, Normal Respiratory Effort


Cardiovascular: Regular Rate, Regular Rhythm





- Lab


Values: 





 Laboratory Last Values











MRSA (PCR)  Negative   12/31/19  14:34    














- Allergies


Allergies/Adverse Reactions: 


 Allergies











Allergy/AdvReac Type Severity Reaction Status Date / Time


 


No Known Allergies Allergy   Verified 01/07/20 14:06














- Blood


Blood Available: No





- Acknowledgements


Anesthesia Type Planned: General Anesthesia


Pt an Appropriate Candidate for the Planned Anesthesia: Yes


Alternatives and Risks of Anesthesia Discussed w Pt/Guardian: Yes


Pt/Guardian Understands and Agrees with Anesthesia Plan: Yes





PreAnesthesia Questionnaire


HEENT History: Reports: None


Cardiovascular History: Reports: Hypertension


Respiratory History: Reports: None


Gastrointestinal History: Reports: Other (See Below)


Genitourinary History: Reports: None


OB/GYN History: Reports: None


Musculoskeletal History: Reports: None


Neurological History: Reports: None


Psychiatric History: Reports: Anxiety, Depression


Endocrine/Metabolic History: Reports: None


Hematologic History: Reports: None


Immunologic History: Reports: None


Oncologic (Cancer) History: Reports: None


Dermatologic History: Reports: None





- Past Surgical History


Head Surgeries/Procedures: Reports: None


HEENT Surgical History: Reports: None


Cardiovascular Surgical History: Reports: None


Respiratory Surgical History: Reports: None


GI Surgical History: Reports: Hernia, Inguinal


Female  Surgical History: Reports: None


Male  Surgical History: Reports: None


Endocrine Surgical History: Reports: None


Neurological Surgical History: Reports: None


Musculoskeletal Surgical History: Reports: Arthroscopic Knee


Oncologic Surgical History: Reports: None





- SUBSTANCE USE


Smoking Status *Q: Never Smoker


Tobacco Use Within Last Twelve Months: No


Second Hand Smoke Exposure: No


Days Per Week of Alcohol Use: 1


Recreational Drug Use History: No





- HOME MEDS


Home Medications: 


 Home Meds





Albuterol [Ventolin HFA] 1 - 2 puff INH Q4H PRN 01/07/20 [History]


Lisinopril/Hydrochlorothiazide [Lisinopril-Hctz 20-12.5 mg Tab] 1 tab PO DAILY 

01/07/20 [History]


Acetaminophen/HYDROcodone [Norco 325-5 MG] 1 - 2 tab PO Q6H PRN #30 tablet 01/08 /20 [Rx]


Aspirin 325 mg PO BID #84 tab 01/08/20 [Rx]











- CURRENT (IN HOUSE) MEDS


Current Meds: 





 Current Medications





Epinephrine HCl (Adrenalin)  0 mg IRR ONETIME JOLLY


Lactated Ringer's (Ringers, Lactated)  1,000 mls @ 125 mls/hr IV ASDIRECTED JOLLY


   Last Admin: 01/08/20 06:45 Dose:  125 mls/hr


Lidocaine/Sodium Bicarbonate (Buffered Lidocaine 1% In Ns 8.4%)  0.25 ml IDERM 

ONETIME PRN


   PRN Reason: Prior to IV Start


Sodium Chloride (Saline Flush)  10 ml FLUSH ASDIRECTED PRN


   PRN Reason: Keep Vein Open





Discontinued Medications





Bupivacaine HCl (Sensorcaine-Mpf 0.25%) Confirm Administered Dose 20 ml .ROUTE 

.STK-MED ONE


   Stop: 01/08/20 06:57
Warm